# Patient Record
Sex: FEMALE | Race: WHITE | URBAN - METROPOLITAN AREA
[De-identification: names, ages, dates, MRNs, and addresses within clinical notes are randomized per-mention and may not be internally consistent; named-entity substitution may affect disease eponyms.]

---

## 2022-02-08 ENCOUNTER — APPOINTMENT (RX ONLY)
Dept: URBAN - METROPOLITAN AREA CLINIC 123 | Facility: CLINIC | Age: 67
Setting detail: DERMATOLOGY
End: 2022-02-08

## 2022-02-08 PROBLEM — C44.619 BASAL CELL CARCINOMA OF SKIN OF LEFT UPPER LIMB, INCLUDING SHOULDER: Status: ACTIVE | Noted: 2022-02-08

## 2022-02-08 PROCEDURE — 17313 MOHS 1 STAGE T/A/L: CPT

## 2022-02-08 PROCEDURE — ? MOHS SURGERY

## 2022-02-08 NOTE — PROCEDURE: MOHS SURGERY
Patient notified.   Complex Repair And Flap Additional Text (Will Appearing After The Standard Complex Repair Text): The complex repair was not sufficient to completely close the primary defect. The remaining additional defect was repaired with the flap mentioned below.

## 2023-03-07 NOTE — PROCEDURE: MOHS SURGERY
Patient Transferred to: 10T  Handoff Report Given to: Brigida ANDREWS Suturegard Retention Suture: 2-0 Nylon
